# Patient Record
Sex: FEMALE | Race: WHITE | Employment: FULL TIME | URBAN - METROPOLITAN AREA
[De-identification: names, ages, dates, MRNs, and addresses within clinical notes are randomized per-mention and may not be internally consistent; named-entity substitution may affect disease eponyms.]

---

## 2024-04-16 RX ORDER — IBUPROFEN 200 MG
200 TABLET ORAL EVERY 6 HOURS PRN
COMMUNITY

## 2024-04-16 RX ORDER — ACETAMINOPHEN 160 MG
2000 TABLET,DISINTEGRATING ORAL DAILY
COMMUNITY

## 2024-04-16 RX ORDER — ACETAMINOPHEN 325 MG/1
650 TABLET ORAL EVERY 6 HOURS PRN
COMMUNITY

## 2024-04-16 RX ORDER — AMLODIPINE BESYLATE 5 MG/1
5 TABLET ORAL DAILY
COMMUNITY

## 2024-04-16 RX ORDER — M-VIT,TX,IRON,MINS/CALC/FOLIC 27MG-0.4MG
1 TABLET ORAL DAILY
COMMUNITY

## 2024-04-18 ENCOUNTER — ANESTHESIA (OUTPATIENT)
Dept: OPERATING ROOM | Age: 69
End: 2024-04-18
Payer: MEDICARE

## 2024-04-18 ENCOUNTER — ANESTHESIA EVENT (OUTPATIENT)
Dept: OPERATING ROOM | Age: 69
End: 2024-04-18
Payer: MEDICARE

## 2024-04-18 ENCOUNTER — HOSPITAL ENCOUNTER (OUTPATIENT)
Age: 69
Setting detail: OUTPATIENT SURGERY
Discharge: HOME | End: 2024-04-18
Attending: OPHTHALMOLOGY | Admitting: OPHTHALMOLOGY
Payer: MEDICARE

## 2024-04-18 VITALS
SYSTOLIC BLOOD PRESSURE: 142 MMHG | WEIGHT: 247 LBS | HEART RATE: 54 BPM | RESPIRATION RATE: 15 BRPM | TEMPERATURE: 98.4 F | DIASTOLIC BLOOD PRESSURE: 100 MMHG | HEIGHT: 65 IN | OXYGEN SATURATION: 100 % | BODY MASS INDEX: 41.15 KG/M2

## 2024-04-18 PROBLEM — H35.341 MACULAR HOLE, RIGHT EYE: Status: ACTIVE | Noted: 2024-04-18

## 2024-04-18 LAB
BUN BLD-MCNC: 10 MG/DL (ref 8–26)
EGFR, POC: >90 ML/MIN/1.73M2
GLUCOSE BLD-MCNC: 119 MG/DL (ref 74–100)
POC CREATININE: 0.4 MG/DL (ref 0.51–1.19)

## 2024-04-18 PROCEDURE — 82565 ASSAY OF CREATININE: CPT

## 2024-04-18 PROCEDURE — 7100000011 HC PHASE II RECOVERY - ADDTL 15 MIN: Performed by: OPHTHALMOLOGY

## 2024-04-18 PROCEDURE — 6370000000 HC RX 637 (ALT 250 FOR IP): Performed by: OPHTHALMOLOGY

## 2024-04-18 PROCEDURE — 2500000003 HC RX 250 WO HCPCS: Performed by: OPHTHALMOLOGY

## 2024-04-18 PROCEDURE — 82947 ASSAY GLUCOSE BLOOD QUANT: CPT

## 2024-04-18 PROCEDURE — 6360000002 HC RX W HCPCS: Performed by: OPHTHALMOLOGY

## 2024-04-18 PROCEDURE — 2720000010 HC SURG SUPPLY STERILE: Performed by: OPHTHALMOLOGY

## 2024-04-18 PROCEDURE — 3600000014 HC SURGERY LEVEL 4 ADDTL 15MIN: Performed by: OPHTHALMOLOGY

## 2024-04-18 PROCEDURE — 6360000002 HC RX W HCPCS: Performed by: NURSE ANESTHETIST, CERTIFIED REGISTERED

## 2024-04-18 PROCEDURE — 2580000003 HC RX 258: Performed by: ANESTHESIOLOGY

## 2024-04-18 PROCEDURE — 3700000000 HC ANESTHESIA ATTENDED CARE: Performed by: OPHTHALMOLOGY

## 2024-04-18 PROCEDURE — 7100000010 HC PHASE II RECOVERY - FIRST 15 MIN: Performed by: OPHTHALMOLOGY

## 2024-04-18 PROCEDURE — 3600000004 HC SURGERY LEVEL 4 BASE: Performed by: OPHTHALMOLOGY

## 2024-04-18 PROCEDURE — 2709999900 HC NON-CHARGEABLE SUPPLY: Performed by: OPHTHALMOLOGY

## 2024-04-18 PROCEDURE — 3700000001 HC ADD 15 MINUTES (ANESTHESIA): Performed by: OPHTHALMOLOGY

## 2024-04-18 PROCEDURE — 2580000003 HC RX 258: Performed by: OPHTHALMOLOGY

## 2024-04-18 PROCEDURE — 84520 ASSAY OF UREA NITROGEN: CPT

## 2024-04-18 RX ORDER — TROPICAMIDE 10 MG/ML
SOLUTION/ DROPS OPHTHALMIC PRN
Status: DISCONTINUED | OUTPATIENT
Start: 2024-04-18 | End: 2024-04-18 | Stop reason: HOSPADM

## 2024-04-18 RX ORDER — PROPOFOL 10 MG/ML
INJECTION, EMULSION INTRAVENOUS PRN
Status: DISCONTINUED | OUTPATIENT
Start: 2024-04-18 | End: 2024-04-18 | Stop reason: SDUPTHER

## 2024-04-18 RX ORDER — ERYTHROMYCIN 5 MG/G
OINTMENT OPHTHALMIC PRN
Status: DISCONTINUED | OUTPATIENT
Start: 2024-04-18 | End: 2024-04-18 | Stop reason: HOSPADM

## 2024-04-18 RX ORDER — SODIUM CHLORIDE 0.9 % (FLUSH) 0.9 %
5-40 SYRINGE (ML) INJECTION PRN
Status: DISCONTINUED | OUTPATIENT
Start: 2024-04-18 | End: 2024-04-18 | Stop reason: HOSPADM

## 2024-04-18 RX ORDER — ONDANSETRON 2 MG/ML
4 INJECTION INTRAMUSCULAR; INTRAVENOUS
Status: DISCONTINUED | OUTPATIENT
Start: 2024-04-18 | End: 2024-04-18 | Stop reason: HOSPADM

## 2024-04-18 RX ORDER — TOBRAMYCIN AND DEXAMETHASONE 3; 1 MG/ML; MG/ML
1 SUSPENSION/ DROPS OPHTHALMIC
Status: COMPLETED | OUTPATIENT
Start: 2024-04-18 | End: 2024-04-18

## 2024-04-18 RX ORDER — BALANCED SALT SOLUTION ENRICHED WITH BICARBONATE, DEXTROSE, AND GLUTATHIONE
KIT INTRAOCULAR PRN
Status: DISCONTINUED | OUTPATIENT
Start: 2024-04-18 | End: 2024-04-18 | Stop reason: HOSPADM

## 2024-04-18 RX ORDER — WATER 10 ML/10ML
INJECTION INTRAMUSCULAR; INTRAVENOUS; SUBCUTANEOUS PRN
Status: DISCONTINUED | OUTPATIENT
Start: 2024-04-18 | End: 2024-04-18 | Stop reason: HOSPADM

## 2024-04-18 RX ORDER — INDOCYANINE GREEN AND WATER 25 MG
KIT INJECTION PRN
Status: DISCONTINUED | OUTPATIENT
Start: 2024-04-18 | End: 2024-04-18 | Stop reason: HOSPADM

## 2024-04-18 RX ORDER — SODIUM CHLORIDE 9 MG/ML
INJECTION, SOLUTION INTRAVENOUS PRN
Status: DISCONTINUED | OUTPATIENT
Start: 2024-04-18 | End: 2024-04-18 | Stop reason: HOSPADM

## 2024-04-18 RX ORDER — MIDAZOLAM HYDROCHLORIDE 2 MG/2ML
1 INJECTION, SOLUTION INTRAMUSCULAR; INTRAVENOUS EVERY 10 MIN PRN
Status: DISCONTINUED | OUTPATIENT
Start: 2024-04-18 | End: 2024-04-18 | Stop reason: HOSPADM

## 2024-04-18 RX ORDER — SODIUM CHLORIDE 0.9 % (FLUSH) 0.9 %
5-40 SYRINGE (ML) INJECTION EVERY 12 HOURS SCHEDULED
Status: DISCONTINUED | OUTPATIENT
Start: 2024-04-18 | End: 2024-04-18 | Stop reason: HOSPADM

## 2024-04-18 RX ORDER — PHENYLEPHRINE HYDROCHLORIDE 100 MG/ML
1 SOLUTION/ DROPS OPHTHALMIC EVERY 5 MIN PRN
Status: COMPLETED | OUTPATIENT
Start: 2024-04-18 | End: 2024-04-18

## 2024-04-18 RX ORDER — CEFTAZIDIME 1 G/1
INJECTION, POWDER, FOR SOLUTION INTRAMUSCULAR; INTRAVENOUS PRN
Status: DISCONTINUED | OUTPATIENT
Start: 2024-04-18 | End: 2024-04-18 | Stop reason: HOSPADM

## 2024-04-18 RX ORDER — LIDOCAINE HYDROCHLORIDE 10 MG/ML
1 INJECTION, SOLUTION INFILTRATION; PERINEURAL
Status: DISCONTINUED | OUTPATIENT
Start: 2024-04-18 | End: 2024-04-18 | Stop reason: HOSPADM

## 2024-04-18 RX ORDER — SODIUM CHLORIDE, SODIUM LACTATE, POTASSIUM CHLORIDE, CALCIUM CHLORIDE 600; 310; 30; 20 MG/100ML; MG/100ML; MG/100ML; MG/100ML
INJECTION, SOLUTION INTRAVENOUS CONTINUOUS
Status: DISCONTINUED | OUTPATIENT
Start: 2024-04-18 | End: 2024-04-18 | Stop reason: HOSPADM

## 2024-04-18 RX ORDER — TROPICAMIDE 10 MG/ML
1 SOLUTION/ DROPS OPHTHALMIC EVERY 5 MIN PRN
Status: COMPLETED | OUTPATIENT
Start: 2024-04-18 | End: 2024-04-18

## 2024-04-18 RX ORDER — NALOXONE HYDROCHLORIDE 0.4 MG/ML
INJECTION, SOLUTION INTRAMUSCULAR; INTRAVENOUS; SUBCUTANEOUS PRN
Status: DISCONTINUED | OUTPATIENT
Start: 2024-04-18 | End: 2024-04-18 | Stop reason: HOSPADM

## 2024-04-18 RX ORDER — DEXTROSE MONOHYDRATE 25 G/50ML
INJECTION, SOLUTION INTRAVENOUS PRN
Status: DISCONTINUED | OUTPATIENT
Start: 2024-04-18 | End: 2024-04-18 | Stop reason: HOSPADM

## 2024-04-18 RX ADMIN — PHENYLEPHRINE HYDROCHLORIDE 1 DROP: 100 SOLUTION/ DROPS OPHTHALMIC at 06:55

## 2024-04-18 RX ADMIN — SODIUM CHLORIDE, POTASSIUM CHLORIDE, SODIUM LACTATE AND CALCIUM CHLORIDE: 600; 310; 30; 20 INJECTION, SOLUTION INTRAVENOUS at 07:10

## 2024-04-18 RX ADMIN — TOBRAMYCIN AND DEXAMETHASONE 1 DROP: 3; 1 SUSPENSION/ DROPS OPHTHALMIC at 06:55

## 2024-04-18 RX ADMIN — TROPICAMIDE 1 DROP: 10 SOLUTION/ DROPS OPHTHALMIC at 06:55

## 2024-04-18 RX ADMIN — PHENYLEPHRINE HYDROCHLORIDE 1 DROP: 100 SOLUTION/ DROPS OPHTHALMIC at 07:00

## 2024-04-18 RX ADMIN — TROPICAMIDE 1 DROP: 10 SOLUTION/ DROPS OPHTHALMIC at 07:05

## 2024-04-18 RX ADMIN — PHENYLEPHRINE HYDROCHLORIDE 1 DROP: 100 SOLUTION/ DROPS OPHTHALMIC at 07:05

## 2024-04-18 RX ADMIN — PROPOFOL 100 MG: 10 INJECTION, EMULSION INTRAVENOUS at 07:49

## 2024-04-18 RX ADMIN — TROPICAMIDE 1 DROP: 10 SOLUTION/ DROPS OPHTHALMIC at 07:00

## 2024-04-18 ASSESSMENT — PAIN SCALES - GENERAL
PAINLEVEL_OUTOF10: 0

## 2024-04-18 ASSESSMENT — PAIN - FUNCTIONAL ASSESSMENT: PAIN_FUNCTIONAL_ASSESSMENT: NONE - DENIES PAIN

## 2024-04-18 NOTE — ANESTHESIA POSTPROCEDURE EVALUATION
Department of Anesthesiology  Postprocedure Note    Patient: Nguyen Julio  MRN: [id number]  YOB: 1955  Date of evaluation: 4/18/2024    Procedure Summary       Date: 04/18/24 Room / Location: [address]    Anesthesia Start: 0742 Anesthesia Stop: 0833    Procedure: VITRECTOMY 25 GAUGE, AIR FLUID EXCHANGE, AIR GAS EXCHANGE WITH 18% SF6, ICG (Right) Diagnosis:       Macular hole of right eye      (Macular hole of right eye [H35.341])    Surgeons: Henok Peck MD Responsible Provider: Pam Schulz MD    Anesthesia Type: MAC ASA Status: 2            Anesthesia Type: No value filed.    Carole Phase I: Carole Score: 10    Carole Phase II: Carole Score: 10    Anesthesia Post Evaluation    Patient location during evaluation: PACU  Patient participation: complete - patient participated  Level of consciousness: awake and alert  Pain score: 0  Airway patency: patent  Nausea & Vomiting: no vomiting and no nausea  Cardiovascular status: hemodynamically stable  Respiratory status: acceptable  Hydration status: stable  Pain management: adequate    No notable events documented.

## 2024-04-18 NOTE — ANESTHESIA PRE PROCEDURE
Department of Anesthesiology  Preprocedure Note       Name:  Nguyen Julio   Age:  68 y.o.  :  1955                                          MRN:  [id number]         Date:  2024      Surgeon: Surgeon(s):  Henok Peck MD    Procedure: Procedure(s):  VITRECTOMY 25 GAUGE, GAS FLUID EXCHANGE, ICG    Medications prior to admission:   Prior to Admission medications    Medication Sig Start Date End Date Taking? Authorizing Provider   amLODIPine (NORVASC) 5 MG tablet Take 1 tablet by mouth daily   Yes ProviderRadha MD   Multiple Vitamins-Minerals (THERAPEUTIC MULTIVITAMIN-MINERALS) tablet Take 1 tablet by mouth daily   Yes Radha Gomes MD   vitamin D (VITAMIN D3) 50 MCG (2000) CAPS capsule Take 1 capsule by mouth daily   Yes Radha Gomes MD   ibuprofen (ADVIL;MOTRIN) 200 MG tablet Take 1 tablet by mouth every 6 hours as needed for Pain   Yes Radha Gomes MD   acetaminophen (TYLENOL) 325 MG tablet Take 2 tablets by mouth every 6 hours as needed for Pain   Yes ProviderRadha MD       Current medications:    Current Facility-Administered Medications   Medication Dose Route Frequency Provider Last Rate Last Admin   • tropicamide (MYDRIACYL) 1 % ophthalmic solution 1 drop  1 drop Right Eye Q5 Min PRN Henok Peck MD   1 drop at 24 0655   • phenylephrine (FIFI-SYNEPHRINE) 10 % ophthalmic solution 1 drop  1 drop Right Eye Q5 Min PRN Henok Peck MD   1 drop at 24 0655   • lactated ringers IV soln infusion   IntraVENous Continuous Pam Schulz MD           Allergies:  No Known Allergies    Problem List:  There is no problem list on file for this patient.      Past Medical History:        Diagnosis Date   • Arthritis    • COVID-19    • COVID-19 vaccine administered    • Distorted vision     right   • Hypertension    • Under care of team     pcp dr harris CNP   • Wears glasses        Past Surgical History:        Procedure Laterality Date   •

## 2024-04-18 NOTE — H&P
History and Physical    Pt Name: Nguyen Julio  MRN: [id number]  YOB: 1955  Date of evaluation: 4/18/2024    SUBJECTIVE:   History of Chief Complaint:    Patient presents preprocedure for VITRECTOMY 25 GAUGE, GAS FLUID EXCHANGE, ICG.  She reports blurred vision in the eye since January, distortion.  She says that she has a history of floaters in the vision, wears glasses.  She has been diagnosed with macular hole, has been scheduled for procedure today.   Past Medical History    has a past medical history of Arthritis, COVID-19, COVID-19 vaccine administered, Distorted vision, Hypertension, Under care of team, and Wears glasses.  Past Surgical History   has a past surgical history that includes Gastric bypass surgery (1994); Knee arthroscopy (Bilateral); Tonsillectomy (1969); Wrist surgery (Left, 2014); and Colonoscopy.  Medications  Prior to Admission medications    Medication Sig Start Date End Date Taking? Authorizing Provider   amLODIPine (NORVASC) 5 MG tablet Take 1 tablet by mouth daily   Yes Radha Gomes MD   Multiple Vitamins-Minerals (THERAPEUTIC MULTIVITAMIN-MINERALS) tablet Take 1 tablet by mouth daily   Yes Radha Gomes MD   vitamin D (VITAMIN D3) 50 MCG (2000 UT) CAPS capsule Take 1 capsule by mouth daily   Yes Radha Gomes MD   ibuprofen (ADVIL;MOTRIN) 200 MG tablet Take 1 tablet by mouth every 6 hours as needed for Pain   Yes Radha Gomes MD   acetaminophen (TYLENOL) 325 MG tablet Take 2 tablets by mouth every 6 hours as needed for Pain   Yes Radha Gomes MD     Allergies  has No Known Allergies.  Family History  family history includes Heart Disease in her father.  Social History   reports that she has never smoked. She has never used smokeless tobacco.   reports no history of alcohol use.   reports no history of drug use.  Marital Status   Occupation works for [employer]    Review of Systems:  CONSTITUTIONAL:   negative for fevers, chills,  fatigue and malaise    EYES:   See HPI   HEENT:   negative for tinnitus, epistaxis and sore throat     RESPIRATORY:   negative for cough, shortness of breath, wheezing     CARDIOVASCULAR:   negative for chest pain, palpitations, syncope, edema     GASTROINTESTINAL:   negative for nausea, vomiting     GENITOURINARY/RENAL:   negative for incontinence     MUSCULOSKELETAL:   negative for neck or back pain     NEUROLOGICAL:   Negative for weakness and tingling  negative for headaches and dizziness     PSYCHIATRIC:   negative for anxiety         OBJECTIVE:   VITALS:  height is 1.651 m (5' 5\") and weight is 112 kg (247 lb). Her temporal temperature is 97.2 °F (36.2 °C). Her blood pressure is 156/87 (abnormal) and her pulse is 71. Her respiration is 16 and oxygen saturation is 100%.   CONSTITUTIONAL:alert & cooperative, no acute distress.  Very pleasant and talkative.  SKIN:  Brief inspection of skin, warm and dry, no rashes on exposed areas of skin   HEAD:  Normocephalic, atraumatic   EYES: wearing glasses..  EOMs intact.    EARS:  Hearing grossly WNL.    NOSE:  Nares patent.  No rhinorrhea   MOUTH/THROAT:  benign  NECK:good ROM   LUNGS: Clear to auscultation bilaterally, no wheezes.  CARDIOVASCULAR: Heart sounds are normal.  Regular rate and rhythm without murmur.  ABDOMEN: non distended.  EXTREMITIES: positive edema bilateral lower extremities.    IMPRESSIONS:   Macular hole right eye   has a past medical history of Arthritis, COVID-19 (2022), COVID-19 vaccine administered, Distorted vision, Hypertension, Under care of team, and Wears glasses.   PLANS:   VITRECTOMY 25 GAUGE, GAS FLUID EXCHANGE, ICG     KOREY RASMUSSEN PA-C  Electronically signed 4/18/2024 at 7:05 AM

## 2024-04-18 NOTE — OP NOTE
Operative Note      Patient: Nguyen Julio  YOB: 1955  MRN: [id number]    Date of Procedure: 4/18/2024    OPERATIVE NOTE    PREOP Diagnosis: Macular Hole, Right Eye    POSTOP Diagnosis:  Same    Procedure:  Vitrectomy,  membrane stripping, removal of internal limiting membrane,  ICG, Right eye    Anesthesia:  MAC    Surgeon:  Henok Peck MD    EBL:  Minimal    Fluids:  See anesthesia record    Drains:  None    Specimen:  None    Complications:  None    Reason for operation:   The patient has significant vision loss that is interfering with daily activities. Patient wishes to have surgery to remove macular pucker in anticipation of better vision.  Patient understands vision is not likely to return fully to normal and it will take 3-6 months to obtain best vision.  Patient understands risks of surgery include, but are not limited to, bleeding, infection and retinal detachment. Cataract development is accelerated, usually resulting in cataract surgery in 6-24 months       Procedure:  The patient was brought to the operating room in good condition. Transient Propofol was given. Retrobulbar and topical anaesthesia were administered. The patient was prepped and draped in the usual manner. 25 gauge vitrectomy trocars were inserted in the usual quadrants. Infusion was inserted in the inferior temporal quadrant. Light pipe and ocutome placed through the superior trocars. Vitreous was removed from anterior to posterior and trimmed out past the equator in all quadrants. ICG was placed over the posterior pole and allowed to remain in place for 30 seconds. It was removed with silicone tipped extrusion. Intraocular forceps were used to peel ILM and ERM from the central 2/3 of the posterior pole. No significant bleeding occurred at any time. An air-fluid exchange was done. SF6 was exchanged for air. No tears or detachment were found with scleral depression. The trocars were removed and sutured if there any

## (undated) DEVICE — REVOLUTION DSP 25G ILM FORCEPS: Brand: ALCON GRIESHABER REVOLUTION

## (undated) DEVICE — STANDARD HYPODERMIC NEEDLE,ALUMINUM HUB: Brand: MONOJECT

## (undated) DEVICE — SYRINGE MED 50ML LUERLOCK TIP

## (undated) DEVICE — 1 EACH 40411 STERILE DISPOSABLE SUPER VIEW® LENS SET & 1 EACH 40100 STERILE MICROSCOPE DRAPE: Brand: SUPER VIEW® PACK

## (undated) DEVICE — SUPER VIEW® STERILE LENS PACK FOR USE WITH THE SUPER VIEW® SYSTEM AND THE BIOM®: Brand: SUPER VIEW® DISPOSABLE LENS SET

## (undated) DEVICE — SYRINGE ST FILTERS W/MCE MEMBRAN

## (undated) DEVICE — OCCUCOAT SYRINGE 1ML 6PK: Brand: OCCUCOAT

## (undated) DEVICE — SOLUTION IRRIGATION BAL SALT SOLUTION 500 ML BTL 6/CA BSS +

## (undated) DEVICE — FORCEPS SURG 25GA FN TIP ECKARDT INT LIMITING MEMBRN PINN

## (undated) DEVICE — GLOVE ORANGE PI 7 1/2   MSG9075

## (undated) DEVICE — DRAPE,REIN 53X77,STERILE: Brand: MEDLINE

## (undated) DEVICE — RETINA PK

## (undated) DEVICE — 25GA EZPASS SOFT TIP CANNULA BOX OF 5: Brand: VORTEX SURGICAL INC

## (undated) DEVICE — STRAP,POSITIONING,KNEE/BODY,FOAM,4X60": Brand: MEDLINE

## (undated) DEVICE — SYRINGE, LUER LOCK, 10ML: Brand: MEDLINE